# Patient Record
Sex: MALE | Race: ASIAN | NOT HISPANIC OR LATINO | URBAN - METROPOLITAN AREA
[De-identification: names, ages, dates, MRNs, and addresses within clinical notes are randomized per-mention and may not be internally consistent; named-entity substitution may affect disease eponyms.]

---

## 2018-09-30 ENCOUNTER — EMERGENCY (EMERGENCY)
Facility: HOSPITAL | Age: 58
LOS: 1 days | Discharge: HOME | End: 2018-09-30
Attending: EMERGENCY MEDICINE | Admitting: EMERGENCY MEDICINE
Payer: COMMERCIAL

## 2018-09-30 VITALS
SYSTOLIC BLOOD PRESSURE: 119 MMHG | DIASTOLIC BLOOD PRESSURE: 84 MMHG | RESPIRATION RATE: 17 BRPM | OXYGEN SATURATION: 97 % | TEMPERATURE: 98 F | HEART RATE: 82 BPM

## 2018-09-30 PROCEDURE — 99218: CPT

## 2018-09-30 RX ORDER — DEXAMETHASONE 0.5 MG/5ML
10 ELIXIR ORAL ONCE
Qty: 0 | Refills: 0 | Status: COMPLETED | OUTPATIENT
Start: 2018-09-30 | End: 2018-09-30

## 2018-09-30 RX ORDER — DEXAMETHASONE 0.5 MG/5ML
10 ELIXIR ORAL EVERY 8 HOURS
Qty: 0 | Refills: 0 | Status: COMPLETED | OUTPATIENT
Start: 2018-09-30 | End: 2018-10-01

## 2018-09-30 RX ORDER — SODIUM CHLORIDE 9 MG/ML
1000 INJECTION INTRAMUSCULAR; INTRAVENOUS; SUBCUTANEOUS
Qty: 0 | Refills: 0 | Status: DISCONTINUED | OUTPATIENT
Start: 2018-09-30 | End: 2018-10-04

## 2018-09-30 RX ORDER — NYSTATIN 500MM UNIT
4 POWDER (EA) MISCELLANEOUS
Qty: 200 | Refills: 0 | OUTPATIENT
Start: 2018-09-30 | End: 2018-10-06

## 2018-09-30 RX ORDER — BUDESONIDE AND FORMOTEROL FUMARATE DIHYDRATE 160; 4.5 UG/1; UG/1
2 AEROSOL RESPIRATORY (INHALATION)
Qty: 0 | Refills: 0 | Status: DISCONTINUED | OUTPATIENT
Start: 2018-09-30 | End: 2018-10-04

## 2018-09-30 RX ORDER — NYSTATIN 500MM UNIT
500000 POWDER (EA) MISCELLANEOUS
Qty: 0 | Refills: 0 | Status: DISCONTINUED | OUTPATIENT
Start: 2018-09-30 | End: 2018-10-04

## 2018-09-30 RX ADMIN — Medication 10 MILLIGRAM(S): at 19:00

## 2018-09-30 RX ADMIN — BUDESONIDE AND FORMOTEROL FUMARATE DIHYDRATE 2 PUFF(S): 160; 4.5 AEROSOL RESPIRATORY (INHALATION) at 21:37

## 2018-09-30 RX ADMIN — Medication 500000 UNIT(S): at 18:22

## 2018-09-30 RX ADMIN — Medication 100 MILLIGRAM(S): at 21:38

## 2018-09-30 RX ADMIN — Medication 102 MILLIGRAM(S): at 18:22

## 2018-09-30 RX ADMIN — BUDESONIDE AND FORMOTEROL FUMARATE DIHYDRATE 2 PUFF(S): 160; 4.5 AEROSOL RESPIRATORY (INHALATION) at 10:00

## 2018-09-30 RX ADMIN — Medication 102 MILLIGRAM(S): at 11:10

## 2018-09-30 RX ADMIN — SODIUM CHLORIDE 125 MILLILITER(S): 9 INJECTION INTRAMUSCULAR; INTRAVENOUS; SUBCUTANEOUS at 10:01

## 2018-09-30 RX ADMIN — Medication 10 MILLIGRAM(S): at 12:51

## 2018-09-30 RX ADMIN — Medication 600 MILLIGRAM(S): at 22:00

## 2018-09-30 RX ADMIN — Medication 100 MILLIGRAM(S): at 14:36

## 2018-09-30 NOTE — PROGRESS NOTE ADULT - SUBJECTIVE AND OBJECTIVE BOX
Patient seen and examined.     Feels much better with decreased throat discomfort. No SOB or changes in voice. Has been NPO.    No respiratory distress or stridor  Voice quality: normal  OC/OP: tongue normal, floor of mouth wnl, no masses or lesions, OP clear  Neck: soft/flat, no LAD    Repeat FOE  NC/NP: septal perforation and changes consistent with prior sinus surgery which patient confirmed  BOT normal, vallecula with mild thrush b/l  Epiglottis sharp  R AE fold and arytenoid edema, L AE fold and arytenoid edema wnl, mild trush  Vocal folds mobile bilaterally  No masses or lesions visualized in post cricoid space or pyriform sinuses bilaterally  Airway is widely patent      A/P  56 yo M w/ hx of asthma w/ globus sensation. CT c/w supraglottis vs. angioedema. Scope exam reveals widely patent airway, w/ significant right ae and arytenoid edema and mild thrush. Afebrile, no fevers or chills  - IV decadron 10mg q8h x3 doses  - IV clindamycin 900mg q8h x3 doses  - d/c on clindamycin x14 days total  - nystatin swish and swallow  - education on use of inhaled steroids on which patient is on for his asthma   - danish Metz

## 2018-09-30 NOTE — ED CDU PROVIDER INITIAL DAY NOTE - PROGRESS NOTE DETAILS
CAILIN Choi: pt resting comfortably in bed NAD, pt denies chest pain, shortness of breath.  Pt is being followed by ENT.  Will continue to monitor.  Lungs CTA b/l, No swelling of the oropharynx.    Uvula midline. CAILIN Choi: pt re-evaluated by ENT advised to give Clindamycin IV x 3 doses and 2 more doses of IV Decadron, pt can eat.  Pt resting comfortable NAD, will continue to monitor.

## 2018-09-30 NOTE — ED CDU PROVIDER INITIAL DAY NOTE - OBJECTIVE STATEMENT
44 y/o male, h/o asthma, c/o foreign body sensation in throat after eating a dumpling. He was seen at [facility]. He had a ct that showed thickening of his right aryepiglotic fold and was transferred to [facility] for ENT consult. Ent saw pt and wanted to give another 10 mg of decadron at 10 AM and then they will re scope the pt. Pt denies difficulty breathing, inability to swallow secretions, sob, wheezing or other complaints.

## 2018-09-30 NOTE — ED ADULT NURSE NOTE - CHIEF COMPLAINT QUOTE
pt. BIBA as transfer from [facility] for ENT consult for possible supraglottitis or angioedema. pt. reports he ate dumpling yesterday and felt it got stuck on his throat. pt. currently speaking clearly, in full sentences, no drooling, no SOB, lung sounds clear. pt. c/o pain upon swallowing. pt. was given Glucagon 1mg IM @ 2336, Decadron 6mg IV @ 0157, and Decadron 4mg IV @ 0211 and duoneb 1x from previous hospital. noted g20 saline lock on rt ac. PMHx: Asthma

## 2018-09-30 NOTE — ED ADULT TRIAGE NOTE - CHIEF COMPLAINT QUOTE
pt. BIBA as transfer from [facility] for ENT consult for possible supraglottitis or angioedema. pt. reports he ate dumpling yesterday and felt it got stuck on his throat. pt. currently speaking clearly, in full sentences, no drooling, no SOB, lung sounds clear. pt. c/o pain upon swallowing. pt. was given Glucagon 1mg IM @ 2336, Decadron 6mg IV @ 0157, and Decadron 4mg IV @ 0211 and duoneb 1x from previous hospital. noted g20 saline lock on rt ac. PMHx: Asthma

## 2018-09-30 NOTE — CONSULT NOTE ADULT - ASSESSMENT
Patient is a 56 yo M w/ hx of asthma w/ globus sensation. CT c/w supraglottis vs. angioedema. Scope exam reveals widely patent airway, w/ significant right ae and arytenoid edema. Afebrile, no fevers or chills  - admit to CDU  - IV decadron 10 mg at 8am  - Will rescope for improvement early afternoon  - If respiratory status declines, page ENT for rescope  - D/w Dr. Metz

## 2018-09-30 NOTE — ED PROVIDER NOTE - MEDICAL DECISION MAKING DETAILS
ENT consult for supraglottic swelling, infx vs angioedema. Airway intact. Pt has received Decadron. ENT consult, dispo per recs, cont to reassess -Shaina

## 2018-09-30 NOTE — ED PROVIDER NOTE - ATTENDING CONTRIBUTION TO CARE
Saba: 57 yom transferred form [facility] for ENT.  Pt c/o throat pain after eating hot dumpling that he thought got stuck in there.  Wife notes voice has changed since.  On exam, pt is well appearing, no distress, clear lungs, oropharync clear as far as visible.  CT at [place] showed supraglottic edema? - ENT called to see pt in ED.

## 2018-09-30 NOTE — CONSULT NOTE ADULT - SUBJECTIVE AND OBJECTIVE BOX
HPI:  Patient is a 57y Male with  PMH significant for asthma who presents from [facility] with foreign body sensation in throat. Patient states last night he was eating dumplings and he choked on one. After this episode he states he felt like he had something stuck in his throat and it was difficult to swallow. Patient went to [facility] where CT imaging showed supraglottitis vs angioedema with thickening of the Right aryepiglottic fold. Patient denies fevers or chills. Denies respiratory distress, no drooling, no stridor or stertor. Lying in bed comfortably on room air, satting 99%. Able to speak in full sentences. No changes to voice. Patient given 10 of decadron at [place]. No CBC drawn. Afebrile.       Physical Exam  T(C): 36.7 (09-30-18 @ 04:24), Max: 36.7 (09-30-18 @ 04:24)  HR: 88 (09-30-18 @ 04:24) (82 - 88)  BP: 130/87 (09-30-18 @ 04:24) (119/84 - 130/87)  RR: 17 (09-30-18 @ 04:24) (17 - 17)  SpO2: 99% (09-30-18 @ 04:24) (97% - 99%)  General: NAD, A+Ox3  No respiratory distress, stridor, or stertor  Voice quality: normal  Face:  Symmetric without masses or lesions  OU: PERRL, EOMI  Nose: nasal cavity clear bilaterally, inferior turbinates normal, mucosa normal without crusting or bleeding  OC/OP: tongue normal, floor of mouth wnl, no masses or lesions, OP clear  Neck: soft/flat, no LAD  CNII-XII intact    Procedure: Flexible laryngoscopy    Pre-procedure diagnosis/Indication for procedure: To evaluate larynx    Anesthesia: None    Description:    A flexible endoscope was used to examine the left and right nasal cavities, posterior oropharynx, hypopharynx, and larynx. The nasal valve areas were examined for abnormalities or collapse. The inferior and middle turbinates were evaluated. The middle and superior meatuses, the sphenoethmoid recesses, and the nasopharynx were examined and inspected for mucopurulence, polyps, and nasal masses. The oropharynx, tongue base/vallecula, epiglottis, aryepiglottic folds, arytenoids, vocal cords, hypopharynx were also inspected for swelling, inflammation, or dysfunction. The patient tolerated the procedure without complications.    Findings:    NP wnl  BOT/vallecula normal  Epiglottis sharp  R AE fold and arytenoid edema  Arytenoids mobile  Vocal folds mobile bilaterally. Complete visualization of right tvc limited due to AE and arytenoid edema  No masses or lesions visualized in post cricoid space or pyriform sinuses bilaterally  Airway is widely patent

## 2018-09-30 NOTE — ED ADULT NURSE NOTE - NSIMPLEMENTINTERV_GEN_ALL_ED
Implemented All Universal Safety Interventions:  [place] to call system. Call bell, personal items and telephone within reach. Instruct patient to call for assistance. Room bathroom lighting operational. Non-slip footwear when patient is off stretcher. Physically safe environment: no spills, clutter or unnecessary equipment. Stretcher in lowest position, wheels locked, appropriate side rails in place.

## 2018-09-30 NOTE — ED PROVIDER NOTE - OBJECTIVE STATEMENT
57y m w PMHx asthma, p/w abnormal CT scan from [facility]. Pt has had globus sensation since yesterday, initially attributed to dinner last night. Was found on CT at [place] to have supraglottitis vs angioedema 57y m w PMHx asthma, p/w abnormal CT scan from [facility]. Pt has had globus sensation since yesterday, initially attributed to dinner last night. Was found on CT at [place] to have supraglottitis vs angioedema. Transferred for ENT evaluation. Given 10mg Decadron. Continues to tolerate PO, no drooling, no stridor, no SOB. No f/c/n/v/d.

## 2018-09-30 NOTE — ED CDU PROVIDER INITIAL DAY NOTE - ATTENDING CONTRIBUTION TO CARE
I agree with the above H&P.  Briefly this is a 43 year old with oropharnygeal swelling after choking on food particle. scoped by ENT. concern for infection. will give multiple doses of steroids and abx in ED and monitor.  no airway distress noted

## 2018-09-30 NOTE — ED ADULT NURSE NOTE - OBJECTIVE STATEMENT
pt on  bed aox3 c/o throat pain when swallowing, transfer from [facility], reports ate dumpling yesterday and felt something left/ stuck in there. denies SOB, able to speak full sentences w/o problem sent here for ENT eval will monitor, pt on  bed aox3 c/o throat pain when swallowing, transfer from [facility] reports ate dumpling yesterday and felt something left/ stuck in there. denies SOB, able to speak full sentences w/o problem sent here for ENT eval will monitor,

## 2018-10-01 VITALS
OXYGEN SATURATION: 98 % | TEMPERATURE: 98 F | SYSTOLIC BLOOD PRESSURE: 118 MMHG | DIASTOLIC BLOOD PRESSURE: 79 MMHG | HEART RATE: 87 BPM | RESPIRATION RATE: 16 BRPM

## 2018-10-01 LAB
ALBUMIN SERPL ELPH-MCNC: 3.9 G/DL — SIGNIFICANT CHANGE UP (ref 3.3–5)
ALP SERPL-CCNC: 53 U/L — SIGNIFICANT CHANGE UP (ref 40–120)
ALT FLD-CCNC: 13 U/L — SIGNIFICANT CHANGE UP (ref 4–41)
AST SERPL-CCNC: 25 U/L — SIGNIFICANT CHANGE UP (ref 4–40)
BILIRUB SERPL-MCNC: 1.1 MG/DL — SIGNIFICANT CHANGE UP (ref 0.2–1.2)
BUN SERPL-MCNC: 17 MG/DL — SIGNIFICANT CHANGE UP (ref 7–23)
CALCIUM SERPL-MCNC: 8.4 MG/DL — SIGNIFICANT CHANGE UP (ref 8.4–10.5)
CHLORIDE SERPL-SCNC: 104 MMOL/L — SIGNIFICANT CHANGE UP (ref 98–107)
CO2 SERPL-SCNC: 16 MMOL/L — LOW (ref 22–31)
CREAT SERPL-MCNC: 0.78 MG/DL — SIGNIFICANT CHANGE UP (ref 0.5–1.3)
GLUCOSE SERPL-MCNC: 161 MG/DL — HIGH (ref 70–99)
POTASSIUM SERPL-MCNC: 4.5 MMOL/L — SIGNIFICANT CHANGE UP (ref 3.5–5.3)
POTASSIUM SERPL-SCNC: 4.5 MMOL/L — SIGNIFICANT CHANGE UP (ref 3.5–5.3)
PROT SERPL-MCNC: 7.2 G/DL — SIGNIFICANT CHANGE UP (ref 6–8.3)
SODIUM SERPL-SCNC: 138 MMOL/L — SIGNIFICANT CHANGE UP (ref 135–145)

## 2018-10-01 PROCEDURE — 99217: CPT

## 2018-10-01 RX ADMIN — Medication 102 MILLIGRAM(S): at 03:17

## 2018-10-01 RX ADMIN — Medication 500000 UNIT(S): at 05:53

## 2018-10-01 RX ADMIN — BUDESONIDE AND FORMOTEROL FUMARATE DIHYDRATE 2 PUFF(S): 160; 4.5 AEROSOL RESPIRATORY (INHALATION) at 08:56

## 2018-10-01 RX ADMIN — Medication 100 MILLIGRAM(S): at 05:53

## 2018-10-01 RX ADMIN — Medication 10 MILLIGRAM(S): at 03:30

## 2018-10-01 RX ADMIN — Medication 500000 UNIT(S): at 00:05

## 2018-10-01 NOTE — ED CDU PROVIDER SUBSEQUENT DAY NOTE - HISTORY
44 y/o male, h/o asthma, c/o foreign body sensation in throat after eating a dumpling,. Pt states that since he has been having persisting globus sensation he went to [facility] where he had a ct that showed thickening of his right aryepiglotic fold and was transferred to [facility] for ENT consult. Pt was scoped by ENT x 2 (uneventful), will receive a total of 3 doses of decadron as well as Clindamycin. Pt reports significant improvement of symptoms. Denies difficulty breathing, inability to swallow secretions, sob, wheezing or other complaints.

## 2018-10-01 NOTE — ED CDU PROVIDER DISPOSITION NOTE - CLINICAL COURSE
57M present with foreign body sensation in throat after eating a dumpling, at [facility] ED found to have supraglottitis on CT, transferred here for ENT, who recommended decadron and clindamycin.  Symptoms now resolved, able to tolerate PO, no change in voice, no drooling. Plan for discharge home with ENT f/u and rx for abx and steroids.

## 2018-10-01 NOTE — ED CDU PROVIDER SUBSEQUENT DAY NOTE - MEDICAL DECISION MAKING DETAILS
44 y/o male, h/o asthma, c/o foreign body sensation in throat after eating a dumpling. Sent to cdu for ENT eval for supra epiglottitis. given decadron, clindamycin and nystatin. plan to dc home today

## 2018-10-01 NOTE — ED CDU PROVIDER SUBSEQUENT DAY NOTE - PROGRESS NOTE DETAILS
Received sign out from CAILIN Maldonado. Pt seen and examined by Dr. Vazquez and HAN. Pt feeling much better, tolerating po. no fever, chills, difficulty swallowing. exam: NAD. well appearing. airway patent. lungs clear b/l. no stridor. abd soft, nt, nd. plan- dc home with ent follow up, clindamycin and nystatin for home

## 2023-05-26 NOTE — ED CDU PROVIDER SUBSEQUENT DAY NOTE - ATTENDING CONTRIBUTION TO CARE
57M present with foreign body sensation in throat after eating a dumpling, at [facility] ED found to have supraglottitis on CT, transferred here for ENT, who recommended decadron and clindamycin.  Patient now reports significant improvement from yesterday, no SOB, no trouble swallowing, no change in voice. Able to eat dinner last night. Overnight VSS, afebrile. On exam well appearing, nad, OP clear, no edema, normal voice, no drooling, mmm, lungs clear, rrr, no rash, speech clear, no focal neuro deficits. By report labs at OSH wnl, however no copies here.  Since patient improved, able to tolerate PO, will not repeat at this time. Plan for discharge home with ENT f/u and rx for abx and steroids.
24

## 2024-08-07 ENCOUNTER — NON-APPOINTMENT (OUTPATIENT)
Age: 64
End: 2024-08-07

## 2024-08-07 ENCOUNTER — APPOINTMENT (OUTPATIENT)
Dept: INTERNAL MEDICINE | Facility: CLINIC | Age: 64
End: 2024-08-07

## 2024-08-07 PROBLEM — Z00.00 ANNUAL PHYSICAL EXAM: Status: ACTIVE | Noted: 2024-08-07

## 2024-08-07 PROBLEM — Z00.00 ENCOUNTER FOR PREVENTIVE HEALTH EXAMINATION: Status: ACTIVE | Noted: 2024-08-07

## 2024-08-07 PROCEDURE — G0444 DEPRESSION SCREEN ANNUAL: CPT | Mod: 59

## 2024-08-07 PROCEDURE — 36415 COLL VENOUS BLD VENIPUNCTURE: CPT

## 2024-08-07 PROCEDURE — 99386 PREV VISIT NEW AGE 40-64: CPT

## 2024-08-08 NOTE — HEALTH RISK ASSESSMENT
[Good] : ~his/her~  mood as  good [Former] : Former [> 15 Years] : > 15 Years [NO] : No [FreeTextEntry1] : health maintenance [0] : 2) Feeling down, depressed, or hopeless: Not at all (0) [PHQ-2 Negative - No further assessment needed] : PHQ-2 Negative - No further assessment needed [[id number]] : 0 [ColonoscopyComments] : will schedule, referral placed [de-identified] : quit at 29

## 2024-08-08 NOTE — HISTORY OF PRESENT ILLNESS
[FreeTextEntry1] : Patient presents to establish care and or a comprehensive annual physical exam.  [de-identified] : Patient is a 63 yr old male present today to establish care and for a comprehensive annual physical exam.   Patient is doing well overall. Patient reports past Hx of asthma and takes 2 puffs daily in the am, no other meds otherwise. Worse asthma, 15 yrs ago, now managed and doing better. Reports one beer/day. Reports nose surgery 10 yrs ago for polyps. No FHx reported.  Colonoscopy, requests referral. Vision, requests referral.  Discussed Shingrix, reports had chickenpox as a child. Denies any CP, chest tightness or SOB. Denies any abdominal pain, urinary symptom, or change in bowel habits. Denies any fever, chills, or night sweats.

## 2024-08-08 NOTE — ASSESSMENT
[FreeTextEntry1] : New patient/ Annual Physical Exam - BP is stable. Continue current management. EKG, stable.  - Check A1c, lipid panels, vitamin levels, urine analysis. TSH, PSA. - Referral for GI, DR. Hammonds for colonoscopy. - Referral for OPTH, Dr. Blunt for vision and eye testing.  - RTO annually or as needed.   Pt verbalized understanding and will reach should any questions/concerns occur.

## 2024-08-08 NOTE — HISTORY OF PRESENT ILLNESS
[FreeTextEntry1] : Patient presents to establish care and or a comprehensive annual physical exam.  [de-identified] : Patient is a 63 yr old male present today to establish care and for a comprehensive annual physical exam.   Patient is doing well overall. Patient reports past Hx of asthma and takes 2 puffs daily in the am, no other meds otherwise. Worse asthma, 15 yrs ago, now managed and doing better. Reports one beer/day. Reports nose surgery 10 yrs ago for polyps. No FHx reported.  Colonoscopy, requests referral. Vision, requests referral.  Discussed Shingrix, reports had chickenpox as a child. Denies any CP, chest tightness or SOB. Denies any abdominal pain, urinary symptom, or change in bowel habits. Denies any fever, chills, or night sweats.

## 2024-08-08 NOTE — ADDENDUM
[FreeTextEntry1] : I, Laine Head, acted as a scribe on behalf of Dr. Johan Berrios MD, on 08/07/2024.   All medical entries made by the scribe were at my, Dr. Johan Berrios MD, direction and personally dictated by me on 08/07/2024. I have reviewed the chart and agree that the record accurately reflects my personal performance of the history, physical exam, assessment and plan. I have also personally directed, reviewed, and agreed with the chart.

## 2024-08-08 NOTE — HEALTH RISK ASSESSMENT
[Good] : ~his/her~  mood as  good [Former] : Former [> 15 Years] : > 15 Years [NO] : No [FreeTextEntry1] : health maintenance [0] : 2) Feeling down, depressed, or hopeless: Not at all (0) [PHQ-2 Negative - No further assessment needed] : PHQ-2 Negative - No further assessment needed [[id number]] : 0 [ColonoscopyComments] : will schedule, referral placed [de-identified] : quit at 29

## 2024-08-11 ENCOUNTER — NON-APPOINTMENT (OUTPATIENT)
Age: 64
End: 2024-08-11

## 2024-08-12 DIAGNOSIS — E78.2 MIXED HYPERLIPIDEMIA: ICD-10-CM

## 2024-08-12 DIAGNOSIS — E11.9 TYPE 2 DIABETES MELLITUS W/OUT COMPLICATIONS: ICD-10-CM

## 2024-08-12 RX ORDER — METFORMIN HYDROCHLORIDE 500 MG/1
500 TABLET, COATED ORAL
Qty: 180 | Refills: 1 | Status: ACTIVE | COMMUNITY
Start: 2024-08-12 | End: 1900-01-01

## 2024-08-12 RX ORDER — ROSUVASTATIN CALCIUM 10 MG/1
10 TABLET, FILM COATED ORAL
Qty: 90 | Refills: 3 | Status: ACTIVE | COMMUNITY
Start: 2024-08-12 | End: 1900-01-01